# Patient Record
(demographics unavailable — no encounter records)

---

## 2025-04-29 NOTE — PHYSICAL EXAM

## 2025-04-29 NOTE — PHYSICAL EXAM

## 2025-04-29 NOTE — PHYSICAL EXAM
[Alert] : alert [Normal Voice/Communication] : normal voice/communication [Healthy Appearing] : healthy appearing [No Acute Distress] : no acute distress [Sclera] : the sclera and conjunctiva were normal [Hearing Threshold Finger Rub Not [place]] : hearing was normal [Normal Lips/Gums] : the lips and gums were normal [Oropharynx] : the oropharynx was normal [Normal Appearance] : the appearance of the neck was normal [No Neck Mass] : no neck mass was observed [No Respiratory Distress] : no respiratory distress [No Acc Muscle Use] : no accessory muscle use [Respiration, Rhythm And Depth] : normal respiratory rhythm and effort [Auscultation Breath Sounds / Voice Sounds] : lungs were clear to auscultation bilaterally [Heart Rate And Rhythm] : heart rate was normal and rhythm regular [Normal S1, S2] : normal S1 and S2 [Murmurs] : no murmurs [Bowel Sounds] : normal bowel sounds [No Masses] : no abdominal mass palpated [Abdomen Soft] : soft [] : no hepatosplenomegaly [Oriented To Time, Place, And Person] : oriented to person, place, and time [Epigastric] : epigastric

## 2025-05-06 NOTE — ASSESSMENT
[FreeTextEntry1] : The patient is a 64-year-old female accompany by  and Son with past medical history significant for DM, Glaucoma, GERD and CVA in 2018 who was referred to the office for initial evaluation for diarrhea.   Diarrhea: - GI PCR panel, Fecal calprotectin, Pancreatic elastase and C-diff  Colonoscopy: -Miralax bowel prep -CBC -Colon Cancer screening: The patient was told of the risks and benefits of the procedure. She was told of the risks of perforation, emergency surgery, bleeding, infections and missed lesions. She is to be on a clear liquid diet the entire day prior to the procedure. She is to complete the entire prescribed bowel prep the day prior to the procedure as directed. The patient is told not to drive, drink alcohol, use recreational drugs, exercise, or work the day of the procedure. She was told of the need for an escort to accompany the patient home after the procedure. The patient is aware that the procedure may be cancelled if she fails to follow the directions. The patient agreed and will schedule for the procedure. The patient can take the antihypertensive medication with a sip of water one hour prior to the procedure. The patient is to hold the blood thinner medication or NSAIDS for 7 days prior to the procedure.  She is to hold the Jardiance 3 days before and hold metformin and Januvia the day of procedure.  The patient is to be NPO after midnight and bowel prep was given. The patient is to return for the procedure.

## 2025-05-06 NOTE — HISTORY OF PRESENT ILLNESS
[FreeTextEntry1] : The patient is a 64-year-old female accompany by  and Son with past medical history significant for DM, Glaucoma, GERD and CVA in 2018 who was referred to the office for initial evaluation for diarrhea.  She was started on Metformin 3-4 years ago and has been having diarrhea ever since. She complains of intermittent 3-5 loose stools daily. She was placed Flagyl by her PCP for diarrhea. She complains of loss of appetite but denies weight loss. Denies blood in the stools. She has never had a colonoscopy.   Family history of colon cancer: no BM/D: 3-5 lost stool daily Constipation: yes, after taking Flagyl Blood/mucus: no Change in bowel habits: no Weight changes: no On Jardiance and Januvia    Dysphagia: no Odynophagia: no GERD sx: Yes, on pantoprazole Smoking: no NSAID use: no CBC: no

## 2025-06-11 NOTE — ASSESSMENT
[FreeTextEntry1] :  64-year-old female accompany by  and Son with past medical history significant for DM, Glaucoma, GERD and CVA in 2018 here for anemia. ECO Diarrhea, calpro -ve (along with GI PCR, C diff, panc elastase)  The patient is medically optimized for procedure(s). All questions have been answered. The risks and benefits of the procedure have been discussed and the patient signed consent for the procedure. Preliminary results will be discussed when the patient wakes up from anesthesia, but definitive results will be discussed after the pathology report is issued in 5 business days.

## 2025-06-11 NOTE — HISTORY OF PRESENT ILLNESS
[FreeTextEntry1] :  64-year-old female accompany by  and Son with past medical history significant for DM, Glaucoma, GERD and CVA in 2018 here for anemia.

## 2025-07-14 NOTE — DISCUSSION/SUMMARY
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    To summarize, about 4 to 5 years ago, she developed left hemiparesis and dysarthria due to an acute stroke.  She does not have her records from the stroke.  However, she had a CT scan in August 2024 which demonstrated lacunar infarcts in the basal bilateral basal ganglia and right periventricular white matter.  It is possible that her stroke was due to either the right basal ganglia or periventricular infarct; and was likely due to small vessel disease.    - Doppler studies on 4/22/2025 were unremarkable.  I defer to you regarding whether the thyroid nodules and enlarged lymph nodes incidentally detected on Doppler warrant further investigation.  - She should benefit from aggressive vascular risk factor control, including blood pressure which was elevated in the office today.  In terms of lipids, target LDL should be less than 55. -   She does not take aspirin.  I advised her to take aspirin 81 mg daily for secondary stroke prevention. - Since her stroke, she has had mild cognitive difficulties e.g. occasionally forgets the date or conversations.  I suspect this represents mild cognitive impairment versus mild dementia, possibly vascular in origin. - She has been experiencing burning in both of her feet for years; I suspect this is due to peripheral neuropathy from diabetes.  For further evaluation, I have referred her to be seen by a neuromuscular specialist.  7/14/2025- MRI ordered; Family will call for result s for b/l lower extremity pain she will see Neuromuscular ? peripheral neuropathy   [Antithrombotic therapy with ___] : antithrombotic therapy with  [unfilled] [Intensive Blood Pressure Control] : intensive blood pressure control [Lipid Lowering Therapy] : lipid lowering therapy [Patient encouraged to discuss with Primary MD] : I encouraged the patient to discuss these important issues with ~his/her~ primary care doctor [Goals and Counseling] : I have reviewed the goals of stroke risk factor modification. I counseled the patient on measures to reduce stroke risk, including the importance of medication compliance, risk factor control, exercise, healthy diet and avoidance of smoking. I reviewed stroke warning signs and symptoms and appropriate actions to take if such occur.

## 2025-07-14 NOTE — HISTORY OF PRESENT ILLNESS
[FreeTextEntry1] : She is a 64-year-old right-handed lady.    She presents the office today with her son.  She has a history of diabetes and hypertension, and had chronic blindness.  About 4 to 5 years ago, while in [facility], she experienced a stroke.  At that time, she had left hemiparesis and dysarthria.  She denies any new focal neurologic symptoms.   CT Head 8/6/24 (per report only) Lacunar infarcts at both basal ganglia and right paraventrivular white matter. Age compatible mild cerebral cortical atrophy.  Carotid Doppler 4/22/25: No evidence of significant arterial occlusive disease in the internal carotid arteries. The ICAs are tortuous bilaterally. No significant stenosis in the external carotid arteries bilaterally. Antegrade flow in both vertebral arteries with normal flow resistance. INCIDENTAL FINDING: Nodules imaged in the right lobe of the thyroid gland. [place]: 1) 0.5 x 0.5 cm. 2) 0.5 x 0.4 cm. Enlarged lymph nodes in the left submandibular region imaged. [place]: 1) 1.4 x 0.7 cm. 2) 1.2 x 0.6 cm.  TCD 4/22/25: Normal  PCP Dr. Landon Gonzalez  7/14/2027- No new symptoms

## 2025-07-14 NOTE — CONSULT LETTER
[Dear  ___] : Dear  [unfilled], [Consult Letter:] : I had the pleasure of evaluating your patient, [unfilled]. [Please see my note below.] : Please see my note below. [Consult Closing:] : Thank you very much for allowing me to participate in the care of this patient.  If you have any questions, please do not hesitate to contact me. [Sincerely,] : Sincerely, [FreeTextEntry2] : Landon Gonzalez MD [FreeTextEntry3] : Tasia Hoffman, FNP-BC

## 2025-07-14 NOTE — PHYSICAL EXAM
[FreeTextEntry1] : Neurologic examination:  Mental status:  The patient is alert and attentive. She stated the date incorrectly.  Speech is clear and fluent with good comprehension.  Cranial nerves:  CN II: She is blind- visual fields were not tested.  CN III, IV, VI: At primary gaze, there is no eye deviation.EOMI. No ptosis  CN V: Facial sensation is intact bilaterally.  CN VII: Face is symmetric with normal eye closure and smile.  CN VII: Hearing is grossly intact.  CN IX, X: Palate elevates symmetrically. Phonation is normal.  CN XI: Head turning and shoulder shrug are intact  CN XII: Tongue is midline with normal movements and no atrophy.  Motor:  There is no pronator drift of out-stretched arms. Muscle bulk and tone are normal. Strength is full bilaterally.  Sensory:  Light touch intact in fingers and toes.  Coordination:  Fine finger movements are mildly slowed on the left. She had trouble participating in finger to nose.  Gait/Stance:  Gait is cautious, due to her poor vision. [General Appearance - Alert] : alert [General Appearance - In No Acute Distress] : in no acute distress [Affect] : the affect was normal [Outer Ear] : the ears and nose were normal in appearance [Examination Of The Oral Cavity] : the lips and gums were normal [Neck Appearance] : the appearance of the neck was normal [Auscultation Breath Sounds / Voice Sounds] : lungs were clear to auscultation bilaterally [Heart Sounds] : normal S1 and S2 [Abnormal Walk] : normal gait [] : no rash

## 2025-07-16 NOTE — REASON FOR VISIT
[Initial Evaluation] : an initial evaluation [Family Member] : family member [FreeTextEntry1] : burning sensation

## 2025-07-16 NOTE — HISTORY OF PRESENT ILLNESS
[FreeTextEntry1] : She is here with her son and prefers her son to translate for her. She reports she has had burning sensation on her both feet up to ankle for 5 years.  She is blind for 42 years from ?corneal ulcer chronic? 5 years ago, she had stroke with left side weakness and loss of sensation. had treatment in [place]. They are from [facility]. She moved to the  5 months ago.  Symptom has not progressed.  Strength is good. PMH: DM at least 5 years. The symptom bothered more at night. don't know last A1C.

## 2025-07-16 NOTE — PHYSICAL EXAM
[Cranial Nerves Trigeminal (V)] : facial sensation intact symmetrically [Cranial Nerves Facial (VII)] : face symmetrical [Cranial Nerves Vestibulocochlear (VIII)] : hearing was intact bilaterally [Cranial Nerves Glossopharyngeal (IX)] : tongue and palate midline [Cranial Nerves Accessory (XI - Cranial And Spinal)] : head turning and shoulder shrug symmetric [Cranial Nerves Hypoglossal (XII)] : there was no tongue deviation with protrusion [Motor Tone] : muscle tone was normal in all four extremities [Motor Strength] : muscle strength was normal in all four extremities [No Muscle Atrophy] : normal bulk in all four extremities [Sensation Tactile Decrease] : light touch was intact [Proprioception] : proprioception was intact [Abnormal Walk] : normal gait [Balance] : balance was intact [Past-pointing] : there was no past-pointing [Tremor] : no tremor present [2+] : Ankle jerk left 2+ [Plantar Reflex Right Only] : normal on the right [Plantar Reflex Left Only] : normal on the left [FreeTextEntry5] : blind both eyes.  [FreeTextEntry6] : no pronator drift.

## 2025-07-16 NOTE — DISCUSSION/SUMMARY
[FreeTextEntry1] : Today's she reports she is fine. sensory exam today was normal. From history, likely peripheral polyneuropathy. after lengthy discussion, will start gabapentin low dose 100 mg at night. side effects discussed. can increase to 200 mg at night if not better. EMG polyneuropathy protocol RTC 3 months. I spent the time noted on the day of this patient encounter preparing for, providing and documenting the above E/M service and counseling and educate patient on differential, workup, disease course, and treatment/management. Education was provided to the patient during this encounter. All questions and concerns were answered and addressed in detail. The patient verbalized understanding and agreed to plan. Patient was advised to continue to monitor for neurologic symptoms and to notify my office or go to the nearest emergency room if there are any changes. Any orders/referrals and communications were provided as well. side effects of the above medications were discussed in detail including but not limited to applicable black box warning and teratogenicity as appropriate.  Patient was advised to bring previous records to my office.